# Patient Record
Sex: MALE | Race: OTHER | NOT HISPANIC OR LATINO | Employment: FULL TIME | URBAN - METROPOLITAN AREA
[De-identification: names, ages, dates, MRNs, and addresses within clinical notes are randomized per-mention and may not be internally consistent; named-entity substitution may affect disease eponyms.]

---

## 2021-07-17 PROCEDURE — 87635 SARS-COV-2 COVID-19 AMP PRB: CPT | Performed by: FAMILY MEDICINE

## 2024-08-22 ENCOUNTER — OCCMED (OUTPATIENT)
Dept: URGENT CARE | Age: 24
End: 2024-08-22
Payer: OTHER MISCELLANEOUS

## 2024-08-22 ENCOUNTER — APPOINTMENT (OUTPATIENT)
Dept: RADIOLOGY | Age: 24
End: 2024-08-22
Payer: OTHER MISCELLANEOUS

## 2024-08-22 DIAGNOSIS — S69.92XA INJURY OF LEFT WRIST, INITIAL ENCOUNTER: ICD-10-CM

## 2024-08-22 DIAGNOSIS — S69.92XA INJURY OF LEFT WRIST, INITIAL ENCOUNTER: Primary | ICD-10-CM

## 2024-08-22 PROCEDURE — G0384 LEV 5 HOSP TYPE B ED VISIT: HCPCS | Performed by: PREVENTIVE MEDICINE

## 2024-08-22 PROCEDURE — 99285 EMERGENCY DEPT VISIT HI MDM: CPT | Performed by: PREVENTIVE MEDICINE

## 2024-08-22 PROCEDURE — 73110 X-RAY EXAM OF WRIST: CPT

## 2024-11-25 ENCOUNTER — OFFICE VISIT (OUTPATIENT)
Dept: FAMILY MEDICINE CLINIC | Facility: CLINIC | Age: 24
End: 2024-11-25
Payer: COMMERCIAL

## 2024-11-25 VITALS
WEIGHT: 253 LBS | BODY MASS INDEX: 36.22 KG/M2 | HEIGHT: 70 IN | OXYGEN SATURATION: 98 % | DIASTOLIC BLOOD PRESSURE: 84 MMHG | HEART RATE: 81 BPM | TEMPERATURE: 97.2 F | SYSTOLIC BLOOD PRESSURE: 120 MMHG

## 2024-11-25 DIAGNOSIS — Z11.59 NEED FOR HEPATITIS C SCREENING TEST: ICD-10-CM

## 2024-11-25 DIAGNOSIS — Z00.00 ANNUAL PHYSICAL EXAM: Primary | ICD-10-CM

## 2024-11-25 DIAGNOSIS — Z13.31 DEPRESSION SCREENING: ICD-10-CM

## 2024-11-25 DIAGNOSIS — Z11.4 ENCOUNTER FOR SCREENING FOR HIV: ICD-10-CM

## 2024-11-25 DIAGNOSIS — M67.40 GANGLION CYST: ICD-10-CM

## 2024-11-25 PROCEDURE — 99385 PREV VISIT NEW AGE 18-39: CPT

## 2024-11-25 PROCEDURE — 99213 OFFICE O/P EST LOW 20 MIN: CPT

## 2024-11-25 NOTE — PROGRESS NOTES
Adult Annual Physical  Name: Rosetta Renteria      : 2000      MRN: [id number]  Encounter Provider: Herberth Pierce MD  Encounter Date: 2024   Encounter department: [facility]    Assessment & Plan  Annual physical exam       Encounter for screening for HIV  Orders:    HIV 1/2 AG/AB w Reflex [facility] for 2 yr old and above; Future    Need for hepatitis C screening test  Orders:    Hepatitis C antibody; Future    Depression screening       Ganglion cyst  Left hand at base of thumb   Started causing pain and discomfort approx starting 2 weeks ago and has been shooting up his arm   His previous pcp had tried to drain but it returned in a few days   Has been dealing with inability to do his job due to fine motor skills     PLAN:  - Hand surgery referral for removal   - Form filled out for work and returned to patient.   Orders:    Ambulatory Referral to Orthopedic Surgery; Future    Immunizations and preventive care screenings were discussed with patient today. Appropriate education was printed on patient's after visit summary.    Counseling:  Alcohol/drug use: discussed moderation in alcohol intake, the recommendations for healthy alcohol use, and avoidance of illicit drug use.  Dental Health: discussed importance of regular tooth brushing, flossing, and dental visits.  Injury prevention: discussed safety/seat belts, safety helmets, smoke detectors, carbon monoxide detectors, and smoking near bedding or upholstery.  Sexual health: discussed sexually transmitted diseases, partner selection, use of condoms, avoidance of unintended pregnancy, and contraceptive alternatives.  Exercise: the importance of regular exercise/physical activity was discussed. Recommend exercise 3-5 times per week for at least 30 minutes.          History of Present Illness     Adult Annual Physical:  Patient presents for annual physical. Rosetta Renteria is a 24 y.o. male presents today to establish care.  "      Current Concerns: left hand pain     PMH: denies  Medications: denies  Allergies: NKDA  Surg Hx:pilonal cyst   Social Hx:   Alcohol: socially    Smoking: Vape - hooka    Drugs: denies    Sexually Active: Not currently sexually active, with females when active    Education level: Two years of university    Occupation: Manual labor in building trucks     Fam Hx:Denies   .     Diet and Physical Activity:  - Diet/Nutrition: poor diet and frequent junk food.  - Exercise: no formal exercise, 1-2 times a week on average and 30-60 minutes on average.    Depression Screening:  - PHQ-2 Score: 0    General Health:  - Sleep: sleeps well.  - Hearing: normal hearing bilateral ears.  - Vision: no vision problems.  - Dental: regular dental visits.     Health:  - History of STDs: no.   - Urinary symptoms: none.     Review of Systems   Constitutional:  Negative for chills and fever.   HENT:  Negative for ear pain and sore throat.    Eyes:  Negative for pain and visual disturbance.   Respiratory:  Negative for cough and shortness of breath.    Cardiovascular:  Negative for chest pain and palpitations.   Gastrointestinal:  Negative for abdominal pain and vomiting.   Genitourinary:  Negative for dysuria and hematuria.   Musculoskeletal:  Negative for arthralgias and back pain.   Skin:  Negative for color change and rash.   Neurological:  Negative for seizures and syncope.   All other systems reviewed and are negative.        Objective   /84 (BP Location: Left arm, Patient Position: Standing, Cuff Size: Standard)   Pulse 81   Temp (!) 97.2 °F (36.2 °C) (Temporal)   Ht 5' 10\" (1.778 m)   Wt 115 kg (253 lb)   SpO2 98%   BMI 36.30 kg/m²     Physical Exam  Vitals and nursing note reviewed.   Constitutional:       General: He is not in acute distress.     Appearance: He is well-developed.   HENT:      Head: Normocephalic and atraumatic.   Eyes:      Conjunctiva/sclera: Conjunctivae normal.   Cardiovascular:      Rate and " Rhythm: Normal rate and regular rhythm.      Heart sounds: No murmur heard.  Pulmonary:      Effort: Pulmonary effort is normal. No respiratory distress.      Breath sounds: Normal breath sounds.   Abdominal:      Palpations: Abdomen is soft.      Tenderness: There is no abdominal tenderness.   Musculoskeletal:         General: No swelling.      Cervical back: Neck supple.   Skin:     General: Skin is warm and dry.      Capillary Refill: Capillary refill takes less than 2 seconds.   Neurological:      Mental Status: He is alert.   Psychiatric:         Mood and Affect: Mood normal.

## 2024-11-25 NOTE — LETTER
November 25, 2024     Patient: Rosetta Renteria  YOB: 2000  Date of Visit: 11/25/2024      To Whom it May Concern:    Rosetta Renteria is under my professional care. Rosetta was seen in my office on 11/25/2024. Rosetta may return to work on 11/26/2024 .    If you have any questions or concerns, please don't hesitate to call.         Sincerely,                Herberth Pierce MD

## 2024-11-26 ENCOUNTER — TELEPHONE (OUTPATIENT)
Age: 24
End: 2024-11-26

## 2024-11-26 NOTE — TELEPHONE ENCOUNTER
Pt is dropping off another form that he will need completed by PCP, short term disability from his place of employment.

## 2024-11-29 PROBLEM — M67.40 GANGLION CYST: Status: ACTIVE | Noted: 2024-11-29

## 2024-12-04 ENCOUNTER — OFFICE VISIT (OUTPATIENT)
Dept: OBGYN CLINIC | Facility: CLINIC | Age: 24
End: 2024-12-04
Payer: COMMERCIAL

## 2024-12-04 DIAGNOSIS — M67.40 GANGLION CYST: ICD-10-CM

## 2024-12-04 PROCEDURE — 99203 OFFICE O/P NEW LOW 30 MIN: CPT | Performed by: SURGERY

## 2024-12-04 NOTE — LETTER
December 4, 2024     Patient: Rosetta Renteria  YOB: 2000  Date of Visit: 12/4/2024      To Whom it May Concern:    Rosetta Renteria is under my professional care. Rosetta was seen in my office on 12/4/2024. Rosetta may return to work on 12/5/2024 with no restrictions .    If you have any questions or concerns, please don't hesitate to call.         Sincerely,          Chito Rincon MD

## 2024-12-04 NOTE — PROGRESS NOTES
ORTHOPAEDIC HAND, WRIST, AND ELBOW OFFICE  VISIT       ASSESSMENT/PLAN:      24 y.o. year old male who presents with left dorsal wrist tenosynovitis    XR were reviewed and mass was examined  Discussed cyst is more tendonous in nature. Discussed surgery is not recommended as tenosynovitis does not do well with excision/debridement, and thus has a very high recurrence rate  Recommend splinting and NSAID's. Splint dispensed for night time use  Mobic prescribed  Work note provided    The patient verbalized understanding of exam findings and treatment plan. We engaged in the shared decision-making process and treatment options were discussed at length with the patient. Surgical and conservative management discussed today along with risks and benefits.    Diagnoses and all orders for this visit:    Ganglion cyst  -     Ambulatory Referral to Orthopedic Surgery  -     Durable Medical Equipment        Follow Up:  Return in about 2 weeks (around 12/18/2024) for Recheck.    To Do Next Visit:  Re-evaluation of current issue        ____________________________________________________________________________________________________________________________________________      CHIEF COMPLAINT:  Chief Complaint   Patient presents with    Left Wrist - Pain     Ganglion cyst          SUBJECTIVE:  Rosetta Renteria is a 24 y.o. year old  male who presents for evaluation left dorsal mass     Patient states he has had a Left hand mass on his dorsal hand since March. Not painful now due to rest but was painful 2 weeks ago.   He cannot recall any injury . No numbness  Back in September had his PCP aspirate it but it retunred 2 days later    I have personally reviewed all the relevant PMH, PSH, SH, FH, Medications and allergies      PAST MEDICAL HISTORY:  History reviewed. No pertinent past medical history.    PAST SURGICAL HISTORY:  History reviewed. No pertinent surgical history.    FAMILY HISTORY:  History reviewed. No pertinent family  "history.    SOCIAL HISTORY:  Social History     Tobacco Use    Smoking status: Never     Passive exposure: Never    Smokeless tobacco: Never   Vaping Use    Vaping status: Never Used   Substance Use Topics    Alcohol use: Not Currently    Drug use: Not Currently       MEDICATIONS:    Current Outpatient Medications:     benzonatate (TESSALON PERLES) 100 mg capsule, Take 1 capsule (100 mg total) by mouth 3 (three) times a day as needed for cough, Disp: 20 capsule, Rfl: 0    ALLERGIES:  No Known Allergies        REVIEW OF SYSTEMS:  Review of Systems   Constitutional:  Negative for chills and fever.   HENT:  Negative for ear pain and sore throat.    Eyes:  Negative for pain and visual disturbance.   Respiratory:  Negative for cough and shortness of breath.    Cardiovascular:  Negative for chest pain and palpitations.   Gastrointestinal:  Negative for abdominal pain and vomiting.   Genitourinary:  Negative for dysuria and hematuria.   Musculoskeletal:  Negative for arthralgias and back pain.   Skin:  Negative for color change and rash.   Neurological:  Negative for seizures and syncope.   All other systems reviewed and are negative.      VITALS:  There were no vitals filed for this visit.    LABS:  HgA1c: No results found for: \"HGBA1C\"  BMP: No results found for: \"GLUCOSE\", \"CALCIUM\", \"NA\", \"K\", \"CO2\", \"CL\", \"BUN\", \"CREATININE\"    _____________________________________________________  PHYSICAL EXAMINATION:  General: well developed and well nourished, alert, oriented times 3, and appears comfortable  Psychiatric: Normal  HEENT: Normocephalic, Atraumatic Trachea Midline, No torticollis  Pulmonary: No audible wheezing or respiratory distress   Abdomen/GI: Non tender, non distended   Cardiovascular: No pitting edema, 2+ radial pulse   Skin: Ganglion , No Erythema, No Lacerations, No Fluctuation, No Ulcerations  Neurovascular: Sensation Intact to the Median, Ulnar, Radial Nerve, Motor Intact to the Median, Ulnar, Radial " Nerve, and Pulses Intact  Musculoskeletal: Normal, except as noted in detailed exam and in HPI.      MUSCULOSKELETAL EXAMINATION:  Right hand:  SILT  Composite fist      Left hand:  SILT  Composite fist   Dorsal hand mass Radial dorsal wrist, broad and soft with some excursion with EPL motion, small mass excursion on EDC motion as well.   Tender over second dorsal compartment   ___________________________________________________  STUDIES REVIEWED:    I have personally reviewed AP lateral and oblique radiographs of Left wrist   which demonstrate      FINDINGS:     No acute fracture or dislocation.     No significant degenerative changes.     No lytic or blastic osseous lesion.     Unremarkable soft tissues.     IMPRESSION:     No acute osseous abnormality.      PROCEDURES PERFORMED:  Procedures  No Procedures performed today    _____________________________________________________      Scribe Attestation      I,:  Darnell Del Real am acting as a scribe while in the presence of the attending physician.:       I,:  Chito Rincon MD personally performed the services described in this documentation    as scribed in my presence.:

## 2024-12-04 NOTE — LETTER
December 4, 2024     Patient: Rosetta Rneteria  YOB: 2000  Date of Visit: 12/4/2024      To Whom it May Concern:    Rosetta Renteria is under my professional care. Rosetta was seen in my office on 12/4/2024. Rosetta may return to work on 12/6/2024 with no restrictions .    If you have any questions or concerns, please don't hesitate to call.         Sincerely,          Chito Rincon MD        C

## 2024-12-19 ENCOUNTER — OFFICE VISIT (OUTPATIENT)
Dept: OBGYN CLINIC | Facility: CLINIC | Age: 24
End: 2024-12-19
Payer: OTHER MISCELLANEOUS

## 2024-12-19 DIAGNOSIS — M67.40 GANGLION CYST: Primary | ICD-10-CM

## 2024-12-19 DIAGNOSIS — M77.8 LEFT WRIST TENDONITIS: ICD-10-CM

## 2024-12-19 PROCEDURE — 99213 OFFICE O/P EST LOW 20 MIN: CPT | Performed by: SURGERY

## 2024-12-19 PROCEDURE — 20605 DRAIN/INJ JOINT/BURSA W/O US: CPT | Performed by: PHYSICIAN ASSISTANT

## 2024-12-19 PROCEDURE — 20550 NJX 1 TENDON SHEATH/LIGAMENT: CPT | Performed by: PHYSICIAN ASSISTANT

## 2024-12-19 RX ORDER — BETAMETHASONE SODIUM PHOSPHATE AND BETAMETHASONE ACETATE 3; 3 MG/ML; MG/ML
6 INJECTION, SUSPENSION INTRA-ARTICULAR; INTRALESIONAL; INTRAMUSCULAR; SOFT TISSUE
Status: COMPLETED | OUTPATIENT
Start: 2024-12-19 | End: 2024-12-19

## 2024-12-19 RX ORDER — LIDOCAINE HYDROCHLORIDE 10 MG/ML
3 INJECTION, SOLUTION INFILTRATION; PERINEURAL
Status: COMPLETED | OUTPATIENT
Start: 2024-12-19 | End: 2024-12-19

## 2024-12-19 RX ADMIN — BETAMETHASONE SODIUM PHOSPHATE AND BETAMETHASONE ACETATE 6 MG: 3; 3 INJECTION, SUSPENSION INTRA-ARTICULAR; INTRALESIONAL; INTRAMUSCULAR; SOFT TISSUE at 13:30

## 2024-12-19 RX ADMIN — LIDOCAINE HYDROCHLORIDE 3 ML: 10 INJECTION, SOLUTION INFILTRATION; PERINEURAL at 13:30

## 2024-12-19 NOTE — PROGRESS NOTES
HPI:  24M here for office follow-up.  Today he states he used the compression glove but did not make a difference.  He still has mass on the dorsal radial side of the hand.  It is bothersome to him.      PE:  Left wrist: Mass consistent with ganglion cyst fluctuant and mildly tender.  There is also second dorsal extensor compartment tenderness.  Normal wrist ROM, full composite fist.  SILT m/r/u nerve distribution.  Palpable radial pulse.      A/P:  Left dorsal wrist ganglion cyst arising from tendon sheath / 2nd dorsal extensor compartment tendonitis  -Cyst aspiration performed in the office today yielding bloody jellylike substance consistent with ganglion cyst.  -Steroid injection provided along the path of the second dorsal extensor compartment.  -Compressive Ace wrap to be left on until tomorrow.  -Activities as tolerated.  -F/U PRN, let us know if symptoms persist or return in the future.    Medium joint arthrocentesis  Universal Protocol:  Consent: Verbal consent obtained.  Risks and benefits: risks, benefits and alternatives were discussed  Consent given by: patient  Patient identity confirmed: verbally with patient  Procedure Details  Location: wrist - Wrist joint: L wrist.  Preparation: Patient was prepped and draped in the usual sterile fashion  Needle size: 18 G    Aspirate: bloody  Patient tolerance: patient tolerated the procedure well with no immediate complications  Dressing:  Sterile dressing applied    + 4 cc bloody jelly like substance aspirated in the office today.  Patient tolerated well.      Hand/upper extremity injection  Universal Protocol:  Consent: Verbal consent obtained.  Risks and benefits: risks, benefits and alternatives were discussed  Consent given by: patient  Patient identity confirmed: verbally with patient  Supporting Documentation  Indications: tendon swelling and pain   Procedure Details  Condition:tendonitis Condition comment: L 2nd dorsal extensor compartment   Preparation:  Patient was prepped and draped in the usual sterile fashion  Needle size: 22 G  Approach: dorsal  Medications administered: 3 mL lidocaine 1 %; 6 mg betamethasone acetate-betamethasone sodium phosphate 6 (3-3) mg/mL  Patient tolerance: patient tolerated the procedure well with no immediate complications  Dressing:  Sterile dressing applied